# Patient Record
Sex: MALE | Race: ASIAN | NOT HISPANIC OR LATINO | URBAN - METROPOLITAN AREA
[De-identification: names, ages, dates, MRNs, and addresses within clinical notes are randomized per-mention and may not be internally consistent; named-entity substitution may affect disease eponyms.]

---

## 2024-11-11 RX ORDER — CILOSTAZOL 100 MG/1
1 TABLET ORAL
Refills: 0 | DISCHARGE

## 2024-11-11 RX ORDER — ASPIRIN/MAG CARB/ALUMINUM AMIN 325 MG
1 TABLET ORAL
Refills: 0 | DISCHARGE

## 2024-11-11 RX ORDER — SODIUM CHLORIDE 9 MG/ML
1000 INJECTION, SOLUTION INTRAMUSCULAR; INTRAVENOUS; SUBCUTANEOUS
Refills: 0 | Status: DISCONTINUED | OUTPATIENT
Start: 2024-11-13 | End: 2024-11-27

## 2024-11-11 RX ORDER — FENOFIBRATE 145 MG/1
1 TABLET, FILM COATED ORAL
Refills: 0 | DISCHARGE

## 2024-11-11 RX ORDER — LOSARTAN POTASSIUM 25 MG/1
1 TABLET ORAL
Refills: 0 | DISCHARGE

## 2024-11-11 RX ORDER — ICOSAPENT ETHYL 1 G/1
2 CAPSULE, LIQUID FILLED ORAL
Refills: 0 | DISCHARGE

## 2024-11-11 RX ORDER — CHLORHEXIDINE GLUCONATE 40 MG/ML
1 SOLUTION TOPICAL ONCE
Refills: 0 | Status: DISCONTINUED | OUTPATIENT
Start: 2024-11-13 | End: 2024-11-27

## 2024-11-11 RX ORDER — AMLODIPINE BESYLATE 10 MG
1 TABLET ORAL
Refills: 0 | DISCHARGE

## 2024-11-11 RX ORDER — SIMVASTATIN 80 MG/1
1 TABLET, FILM COATED ORAL
Refills: 0 | DISCHARGE

## 2024-11-11 NOTE — H&P ADULT - NSICDXPASTMEDICALHX_GEN_ALL_CORE_FT
PAST MEDICAL HISTORY:  Arthritis     HLD (hyperlipidemia)     HTN (hypertension)     PVD (peripheral vascular disease)

## 2024-11-11 NOTE — H&P ADULT - ASSESSMENT
Pt is a 68 YOM current smoker with a PMHx of PVD, arthritis, HTN, and HLD. He was seen on (9/27/24) by  c/o developing severe bilateral thigh, leg, and calf pain with exertion. He mentioned that the pain was relieved with rest. He is only able to walk less than one block and climb less than one flight of stairs before developing claudication.     Echo (8/9/24): LVEF of 70%, Normal wall motion, Trace AI, mild MR, trace to mild TR  SERENA (8/19/24): Right sided findings: resting SERENA and waveforms demonstrate mild (0.70-0.89) popliteal-tibial disease at rest; TBI and waveforms demonstrate mild/mod (0.41-0.70) disease.   Left sided findings: Resting SERENA and waveforms demonstrate mild (0.70-0.89) popliteal-tibial disease at rest; TBI and waveforms demonstrate mild/moderate (0.41-0.70) disease.   LE Arterial Duplex: (9/27/24): 1. There is evidence of occluded stenosis in the right mid superficial and distal SFA. There is evidence of severe (75-99%) stenosis in the left deep femoral artery and left SFA. There is evidence of mild (1-49%) stenosis in the left posterior tibial artery and moderate (50-75) stenosis in the left anterior tibial artery.   2. All other major arteries of the bilateral LE's have no hemodynamically significant stenosis visualized.   Aorta-Illiac Duplex: (9/27/24): The abdominal aorta was visualized from the subxiphoid window to the bilateral common and external iliac arteries. 2. There is no evidence of aneurysm seen in the abdominal aorta. 3. There is no stenosis seen in the proximal, mid, and distal abdominal aorta. 4. There is no evidence of dissection/thrombus present. 5. There is mod stenosis in the bilateral common iliac artery. There is severe stenosis in the left external iliac artery.     In light of Pt's symptoms, risk factors, and abnormal US Doppler LE arterial. Pt now presents for a Iliac intervention.       Mandarin ID used   Malampatti 2   ASA 2  Pt H+H and platelets WNL, Pt without any reports of BRBPR, hematuria, prior ICH, melena and no bleeding risks.   Patient loaded with Aspirin 81, Plavix 600   Pt Cr. and pre cath fluids ordered per protocol 250ml IV bolus over 30min, and maintenance 75 ml for 2 hours  Pt is a Candidate for Moderate Sedation

## 2024-11-11 NOTE — H&P ADULT - HISTORY OF PRESENT ILLNESS
Cardiology: Dr.David GARO Barfield  Pharmacy:  Escort:      Pt is a 68 YOM current smoker with a PMHx of PVD, arthritis, HTN, and HLD. He was seen on (9/27/24) by  c/o developing severe bilateral thigh, leg, and calf pain with exertion. He mentioned that the pain was relieved with rest. He is only able to walk less than one block and climb less than one flight of stairs before developing claudication.     Nuclear Stress Test (08/19/24):   Impressions: 1. Negative stress ECG for ischemia after pharmacological stress, abnormal myocardial perfusion imaging, showing a small amount of ischemia in the inferior location. overall LV systolic function was normal without RWMA's.     Echo (8/9/24): LVEF of 70%, Normal wall motion, Trace AI, mild MR, trace to mild TR    SERENA (8/19/24): Right sided findings: resting SERENA and waveforms demonstrate mild (0.70-0.89) popliteal-tibial disease at rest; TBI and waveforms demonstrate mild/mod (0.41-0.70) disease.   Left sided findings: Resting SERENA and waveforms demonstrate mild (0.70-0.89) popliteal-tibial disease at rest; TBI and waveforms demonstrate mild/moderate (0.41-0.70) disease.     LE Arterial Duplex: (9/27/24): 1. There is evidence of occluded stenosis in the right mid superficial and distal SFA. There is evidence of severe (75-99%) stenosis in the left deep femoral artery and left SFA. There is evidence of mild (1-49%) stenosis in the left posterior tibial artery and moderate (50-75) stenosis in the left anterior tibial artery.   2. All other major arteries of the bilateral LE's have no hemodynamically significant stenosis visualized.     Aorta-Illiac Duplex: (9/27/24): The abdominal aorta was visualized from the subxiphoid window to the bilateral common and external iliac arteries. 2. There is no evidence of aneurysm seen in the abdominal aorta. 3. There is no stenosis seen in the proximal, mid, and distal abdominal aorta. 4. There is no evidence of dissection/thrombus present. 5. There is mod stenosis in the bilateral common iliac artery. There is severe stenosis in the left external iliac artery.       In light of Pt's symptoms, risk factors, and abnormal US Doppler LE arterial. Pt now presents for a Iliac intervention.  Cardiology: Dr.David GARO Barfield  Pharmacy:   Escort: Daughter       Pt is a 68 YOM current smoker with a PMHx of PVD, arthritis, HTN, and HLD. He was seen on (9/27/24) by  c/o developing severe bilateral thigh, leg, and calf pain with exertion. He mentioned that the pain was relieved with rest. He is only able to walk less than one block and climb less than one flight of stairs before developing claudication.     Echo (8/9/24): LVEF of 70%, Normal wall motion, Trace AI, mild MR, trace to mild TR  SERENA (8/19/24): Right sided findings: resting SERENA and waveforms demonstrate mild (0.70-0.89) popliteal-tibial disease at rest; TBI and waveforms demonstrate mild/mod (0.41-0.70) disease.   Left sided findings: Resting SERENA and waveforms demonstrate mild (0.70-0.89) popliteal-tibial disease at rest; TBI and waveforms demonstrate mild/moderate (0.41-0.70) disease.   LE Arterial Duplex: (9/27/24): 1. There is evidence of occluded stenosis in the right mid superficial and distal SFA. There is evidence of severe (75-99%) stenosis in the left deep femoral artery and left SFA. There is evidence of mild (1-49%) stenosis in the left posterior tibial artery and moderate (50-75) stenosis in the left anterior tibial artery.   2. All other major arteries of the bilateral LE's have no hemodynamically significant stenosis visualized.   Aorta-Illiac Duplex: (9/27/24): The abdominal aorta was visualized from the subxiphoid window to the bilateral common and external iliac arteries. 2. There is no evidence of aneurysm seen in the abdominal aorta. 3. There is no stenosis seen in the proximal, mid, and distal abdominal aorta. 4. There is no evidence of dissection/thrombus present. 5. There is mod stenosis in the bilateral common iliac artery. There is severe stenosis in the left external iliac artery.       In light of Pt's symptoms, risk factors, and abnormal US Doppler LE arterial. Pt now presents for a Iliac intervention.

## 2024-11-13 ENCOUNTER — OUTPATIENT (OUTPATIENT)
Dept: OUTPATIENT SERVICES | Facility: HOSPITAL | Age: 69
LOS: 1 days | Discharge: HOME | End: 2024-11-13
Payer: MEDICARE

## 2024-11-13 DIAGNOSIS — I73.9 PERIPHERAL VASCULAR DISEASE, UNSPECIFIED: ICD-10-CM

## 2024-11-13 DIAGNOSIS — Z79.82 LONG TERM (CURRENT) USE OF ASPIRIN: ICD-10-CM

## 2024-11-13 DIAGNOSIS — E78.5 HYPERLIPIDEMIA, UNSPECIFIED: ICD-10-CM

## 2024-11-13 DIAGNOSIS — I10 ESSENTIAL (PRIMARY) HYPERTENSION: ICD-10-CM

## 2024-11-13 DIAGNOSIS — M06.9 RHEUMATOID ARTHRITIS, UNSPECIFIED: ICD-10-CM

## 2024-11-13 DIAGNOSIS — F17.200 NICOTINE DEPENDENCE, UNSPECIFIED, UNCOMPLICATED: ICD-10-CM

## 2024-11-13 LAB
A1C WITH ESTIMATED AVERAGE GLUCOSE RESULT: 5.7 % — HIGH (ref 4–5.6)
ALBUMIN SERPL ELPH-MCNC: 4.7 G/DL — SIGNIFICANT CHANGE UP (ref 3.3–5)
ALP SERPL-CCNC: 40 U/L — SIGNIFICANT CHANGE UP (ref 40–120)
ALT FLD-CCNC: 23 U/L — SIGNIFICANT CHANGE UP (ref 10–45)
ANION GAP SERPL CALC-SCNC: 12 MMOL/L — SIGNIFICANT CHANGE UP (ref 5–17)
ANION GAP SERPL CALC-SCNC: 9 MMOL/L — SIGNIFICANT CHANGE UP (ref 5–17)
APTT BLD: 33.2 SEC — SIGNIFICANT CHANGE UP (ref 24.5–35.6)
AST SERPL-CCNC: 27 U/L — SIGNIFICANT CHANGE UP (ref 10–40)
BASOPHILS # BLD AUTO: 0.03 K/UL — SIGNIFICANT CHANGE UP (ref 0–0.2)
BASOPHILS NFR BLD AUTO: 0.6 % — SIGNIFICANT CHANGE UP (ref 0–2)
BILIRUB SERPL-MCNC: 0.4 MG/DL — SIGNIFICANT CHANGE UP (ref 0.2–1.2)
BUN SERPL-MCNC: 30 MG/DL — HIGH (ref 7–23)
BUN SERPL-MCNC: 35 MG/DL — HIGH (ref 7–23)
CALCIUM SERPL-MCNC: 10.1 MG/DL — SIGNIFICANT CHANGE UP (ref 8.4–10.5)
CALCIUM SERPL-MCNC: 9.2 MG/DL — SIGNIFICANT CHANGE UP (ref 8.4–10.5)
CHLORIDE SERPL-SCNC: 100 MMOL/L — SIGNIFICANT CHANGE UP (ref 96–108)
CHLORIDE SERPL-SCNC: 102 MMOL/L — SIGNIFICANT CHANGE UP (ref 96–108)
CHOLEST SERPL-MCNC: 241 MG/DL — HIGH
CK MB CFR SERPL CALC: 2.1 NG/ML — SIGNIFICANT CHANGE UP (ref 0–6.7)
CK SERPL-CCNC: 93 U/L — SIGNIFICANT CHANGE UP (ref 30–200)
CO2 SERPL-SCNC: 23 MMOL/L — SIGNIFICANT CHANGE UP (ref 22–31)
CO2 SERPL-SCNC: 24 MMOL/L — SIGNIFICANT CHANGE UP (ref 22–31)
CREAT SERPL-MCNC: 1.48 MG/DL — HIGH (ref 0.5–1.3)
CREAT SERPL-MCNC: 1.59 MG/DL — HIGH (ref 0.5–1.3)
EGFR: 46 ML/MIN/1.73M2 — LOW
EGFR: 50 ML/MIN/1.73M2 — LOW
EOSINOPHIL # BLD AUTO: 0.19 K/UL — SIGNIFICANT CHANGE UP (ref 0–0.5)
EOSINOPHIL NFR BLD AUTO: 3.7 % — SIGNIFICANT CHANGE UP (ref 0–6)
ESTIMATED AVERAGE GLUCOSE: 117 MG/DL — HIGH (ref 68–114)
GLUCOSE SERPL-MCNC: 106 MG/DL — HIGH (ref 70–99)
GLUCOSE SERPL-MCNC: 138 MG/DL — HIGH (ref 70–99)
HCT VFR BLD CALC: 31.2 % — LOW (ref 39–50)
HCT VFR BLD CALC: 36.1 % — LOW (ref 39–50)
HDLC SERPL-MCNC: 129 MG/DL — SIGNIFICANT CHANGE UP
HGB BLD-MCNC: 10.2 G/DL — LOW (ref 13–17)
HGB BLD-MCNC: 11.2 G/DL — LOW (ref 13–17)
IMM GRANULOCYTES NFR BLD AUTO: 0.2 % — SIGNIFICANT CHANGE UP (ref 0–0.9)
INR BLD: 0.91 — SIGNIFICANT CHANGE UP (ref 0.85–1.16)
LIPID PNL WITH DIRECT LDL SERPL: 94 MG/DL — SIGNIFICANT CHANGE UP
LYMPHOCYTES # BLD AUTO: 1.23 K/UL — SIGNIFICANT CHANGE UP (ref 1–3.3)
LYMPHOCYTES # BLD AUTO: 23.8 % — SIGNIFICANT CHANGE UP (ref 13–44)
MAGNESIUM SERPL-MCNC: 1.8 MG/DL — SIGNIFICANT CHANGE UP (ref 1.6–2.6)
MAGNESIUM SERPL-MCNC: 1.9 MG/DL — SIGNIFICANT CHANGE UP (ref 1.6–2.6)
MCHC RBC-ENTMCNC: 29.9 PG — SIGNIFICANT CHANGE UP (ref 27–34)
MCHC RBC-ENTMCNC: 31 G/DL — LOW (ref 32–36)
MCHC RBC-ENTMCNC: 31.5 PG — SIGNIFICANT CHANGE UP (ref 27–34)
MCHC RBC-ENTMCNC: 32.7 G/DL — SIGNIFICANT CHANGE UP (ref 32–36)
MCV RBC AUTO: 96.3 FL — SIGNIFICANT CHANGE UP (ref 80–100)
MCV RBC AUTO: 96.3 FL — SIGNIFICANT CHANGE UP (ref 80–100)
MONOCYTES # BLD AUTO: 0.66 K/UL — SIGNIFICANT CHANGE UP (ref 0–0.9)
MONOCYTES NFR BLD AUTO: 12.8 % — SIGNIFICANT CHANGE UP (ref 2–14)
NEUTROPHILS # BLD AUTO: 3.05 K/UL — SIGNIFICANT CHANGE UP (ref 1.8–7.4)
NEUTROPHILS NFR BLD AUTO: 58.9 % — SIGNIFICANT CHANGE UP (ref 43–77)
NON HDL CHOLESTEROL: 112 MG/DL — SIGNIFICANT CHANGE UP
NRBC # BLD: 0 /100 WBCS — SIGNIFICANT CHANGE UP (ref 0–0)
NRBC # BLD: 0 /100 WBCS — SIGNIFICANT CHANGE UP (ref 0–0)
PLATELET # BLD AUTO: 253 K/UL — SIGNIFICANT CHANGE UP (ref 150–400)
PLATELET # BLD AUTO: 299 K/UL — SIGNIFICANT CHANGE UP (ref 150–400)
POTASSIUM SERPL-MCNC: 4.5 MMOL/L — SIGNIFICANT CHANGE UP (ref 3.5–5.3)
POTASSIUM SERPL-MCNC: 4.7 MMOL/L — SIGNIFICANT CHANGE UP (ref 3.5–5.3)
POTASSIUM SERPL-SCNC: 4.5 MMOL/L — SIGNIFICANT CHANGE UP (ref 3.5–5.3)
POTASSIUM SERPL-SCNC: 4.7 MMOL/L — SIGNIFICANT CHANGE UP (ref 3.5–5.3)
PROT SERPL-MCNC: 7.7 G/DL — SIGNIFICANT CHANGE UP (ref 6–8.3)
PROTHROM AB SERPL-ACNC: 10.5 SEC — SIGNIFICANT CHANGE UP (ref 9.9–13.4)
RBC # BLD: 3.24 M/UL — LOW (ref 4.2–5.8)
RBC # BLD: 3.75 M/UL — LOW (ref 4.2–5.8)
RBC # FLD: 13.2 % — SIGNIFICANT CHANGE UP (ref 10.3–14.5)
RBC # FLD: 13.2 % — SIGNIFICANT CHANGE UP (ref 10.3–14.5)
SODIUM SERPL-SCNC: 134 MMOL/L — LOW (ref 135–145)
SODIUM SERPL-SCNC: 136 MMOL/L — SIGNIFICANT CHANGE UP (ref 135–145)
TRIGL SERPL-MCNC: 112 MG/DL — SIGNIFICANT CHANGE UP
WBC # BLD: 5.17 K/UL — SIGNIFICANT CHANGE UP (ref 3.8–10.5)
WBC # BLD: 6.46 K/UL — SIGNIFICANT CHANGE UP (ref 3.8–10.5)
WBC # FLD AUTO: 5.17 K/UL — SIGNIFICANT CHANGE UP (ref 3.8–10.5)
WBC # FLD AUTO: 6.46 K/UL — SIGNIFICANT CHANGE UP (ref 3.8–10.5)

## 2024-11-13 PROCEDURE — 80061 LIPID PANEL: CPT

## 2024-11-13 PROCEDURE — 75716 ARTERY X-RAYS ARMS/LEGS: CPT | Mod: 26,XU

## 2024-11-13 PROCEDURE — C9765: CPT

## 2024-11-13 PROCEDURE — 85730 THROMBOPLASTIN TIME PARTIAL: CPT

## 2024-11-13 PROCEDURE — 83735 ASSAY OF MAGNESIUM: CPT

## 2024-11-13 PROCEDURE — C1760: CPT

## 2024-11-13 PROCEDURE — 80053 COMPREHEN METABOLIC PANEL: CPT

## 2024-11-13 PROCEDURE — 82553 CREATINE MB FRACTION: CPT

## 2024-11-13 PROCEDURE — 83036 HEMOGLOBIN GLYCOSYLATED A1C: CPT

## 2024-11-13 PROCEDURE — 80048 BASIC METABOLIC PNL TOTAL CA: CPT

## 2024-11-13 PROCEDURE — C1894: CPT

## 2024-11-13 PROCEDURE — 85025 COMPLETE CBC W/AUTO DIFF WBC: CPT

## 2024-11-13 PROCEDURE — 85610 PROTHROMBIN TIME: CPT

## 2024-11-13 PROCEDURE — 80076 HEPATIC FUNCTION PANEL: CPT

## 2024-11-13 PROCEDURE — C1887: CPT

## 2024-11-13 PROCEDURE — 82550 ASSAY OF CK (CPK): CPT

## 2024-11-13 PROCEDURE — C1876: CPT

## 2024-11-13 PROCEDURE — 85027 COMPLETE CBC AUTOMATED: CPT

## 2024-11-13 PROCEDURE — 85347 COAGULATION TIME ACTIVATED: CPT

## 2024-11-13 PROCEDURE — 37221: CPT | Mod: 50

## 2024-11-13 PROCEDURE — C1725: CPT

## 2024-11-13 PROCEDURE — C1769: CPT

## 2024-11-13 RX ORDER — SODIUM CHLORIDE 9 MG/ML
250 INJECTION, SOLUTION INTRAMUSCULAR; INTRAVENOUS; SUBCUTANEOUS ONCE
Refills: 0 | Status: COMPLETED | OUTPATIENT
Start: 2024-11-13 | End: 2024-11-13

## 2024-11-13 RX ORDER — ASPIRIN/MAG CARB/ALUMINUM AMIN 325 MG
1 TABLET ORAL
Qty: 30 | Refills: 11
Start: 2024-11-13 | End: 2025-11-07

## 2024-11-13 RX ORDER — SODIUM CHLORIDE 9 MG/ML
1000 INJECTION, SOLUTION INTRAMUSCULAR; INTRAVENOUS; SUBCUTANEOUS
Refills: 0 | Status: DISCONTINUED | OUTPATIENT
Start: 2024-11-13 | End: 2024-11-27

## 2024-11-13 RX ORDER — CLOPIDOGREL 75 MG/1
600 TABLET ORAL ONCE
Refills: 0 | Status: COMPLETED | OUTPATIENT
Start: 2024-11-13 | End: 2024-11-13

## 2024-11-13 RX ORDER — CLOPIDOGREL 75 MG/1
1 TABLET ORAL
Qty: 30 | Refills: 11
Start: 2024-11-13 | End: 2025-11-07

## 2024-11-13 RX ORDER — ASPIRIN/MAG CARB/ALUMINUM AMIN 325 MG
81 TABLET ORAL ONCE
Refills: 0 | Status: COMPLETED | OUTPATIENT
Start: 2024-11-13 | End: 2024-11-13

## 2024-11-13 RX ORDER — SIMVASTATIN 80 MG/1
1 TABLET, FILM COATED ORAL
Qty: 30 | Refills: 3
Start: 2024-11-13 | End: 2025-03-12

## 2024-11-13 RX ORDER — MAGNESIUM OXIDE 400 MG/1
800 TABLET ORAL ONCE
Refills: 0 | Status: COMPLETED | OUTPATIENT
Start: 2024-11-13 | End: 2024-11-13

## 2024-11-13 RX ORDER — MORPHINE SULFATE 30 MG/1
2 TABLET, EXTENDED RELEASE ORAL ONCE
Refills: 0 | Status: DISCONTINUED | OUTPATIENT
Start: 2024-11-13 | End: 2024-11-13

## 2024-11-13 RX ADMIN — MORPHINE SULFATE 2 MILLIGRAM(S): 30 TABLET, EXTENDED RELEASE ORAL at 16:02

## 2024-11-13 RX ADMIN — SODIUM CHLORIDE 500 MILLILITER(S): 9 INJECTION, SOLUTION INTRAMUSCULAR; INTRAVENOUS; SUBCUTANEOUS at 11:48

## 2024-11-13 RX ADMIN — MAGNESIUM OXIDE 800 MILLIGRAM(S): 400 TABLET ORAL at 19:27

## 2024-11-13 RX ADMIN — SODIUM CHLORIDE 250 MILLILITER(S): 9 INJECTION, SOLUTION INTRAMUSCULAR; INTRAVENOUS; SUBCUTANEOUS at 16:02

## 2024-11-13 RX ADMIN — Medication 81 MILLIGRAM(S): at 11:55

## 2024-11-13 RX ADMIN — CLOPIDOGREL 600 MILLIGRAM(S): 75 TABLET ORAL at 11:55

## 2024-11-13 RX ADMIN — MORPHINE SULFATE 2 MILLIGRAM(S): 30 TABLET, EXTENDED RELEASE ORAL at 16:54

## 2024-11-13 RX ADMIN — SODIUM CHLORIDE 75 MILLILITER(S): 9 INJECTION, SOLUTION INTRAMUSCULAR; INTRAVENOUS; SUBCUTANEOUS at 11:48

## 2024-11-13 NOTE — PROGRESS NOTE ADULT - SUBJECTIVE AND OBJECTIVE BOX
Interventional Cardiology PA Post PCI SDA Discharge Note      Patient without complaints. Ambulated and voided without difficulties    Afebrile, VSS    Ext: Left Groin: No hematoma, No bruit, dressing; C/D/I  Left Radial : No hematoma, No bleeding, dressing; C/D/I      Pulses:    intact RAD/DP/PT to baseline     A/P:  Pt is a 68 YOM current smoker with a PMHx of PVD, arthritis, HTN, and HLD. He was seen on (9/27/24) by  c/o developing severe bilateral thigh, leg, and calf pain with exertion. He mentioned that the pain was relieved with rest. He is only able to walk less than one block and climb less than one flight of stairs before developing claudication.     Echo (8/9/24): LVEF of 70%, Normal wall motion, Trace AI, mild MR, trace to mild TR  SERENA (8/19/24): Right sided findings: resting SERENA and waveforms demonstrate mild (0.70-0.89) popliteal-tibial disease at rest; TBI and waveforms demonstrate mild/mod (0.41-0.70) disease.   Left sided findings: Resting SERENA and waveforms demonstrate mild (0.70-0.89) popliteal-tibial disease at rest; TBI and waveforms demonstrate mild/moderate (0.41-0.70) disease.   LE Arterial Duplex: (9/27/24): 1. There is evidence of occluded stenosis in the right mid superficial and distal SFA. There is evidence of severe (75-99%) stenosis in the left deep femoral artery and left SFA. There is evidence of mild (1-49%) stenosis in the left posterior tibial artery and moderate (50-75) stenosis in the left anterior tibial artery.   2. All other major arteries of the bilateral LE's have no hemodynamically significant stenosis visualized.   Aorta-Illiac Duplex: (9/27/24): The abdominal aorta was visualized from the subxiphoid window to the bilateral common and external iliac arteries. 2. There is no evidence of aneurysm seen in the abdominal aorta. 3. There is no stenosis seen in the proximal, mid, and distal abdominal aorta. 4. There is no evidence of dissection/thrombus present. 5. There is mod stenosis in the bilateral common iliac artery. There is severe stenosis in the left external iliac artery.       In light of Pt's symptoms, risk factors, and abnormal US Doppler LE arterial. Pt now presents for a Iliac intervention.       S/p Peripheral angiogram 11/13/24: Right distal common iliac artery 60-70%, YOSVANY x 1 Left external iliac artery 70-80%. Pt will return for staged PCI of dRCI in 5-6 weeks       ACCESS:  LRA (LTR), LFA (PC)  Post cath IVF: 250cc x 4 hours    INTERVENTIONALIST: Dr. Ortiz (F: Jesus Manuel)  F/U CARDS: Dr. Barfield  DAPT: ASA/Plavix  STATIN: simvastatin 20 mg     Post procedure pt had left arm hematoma extending from the wrist to the elbow. It was compressed out.          1.	       Follow-up with PMD/Cardiologist Barfield in 72 hours.  2.            Post procedure labs/EKG reviewed and stable.    3.            Pt given instructions on importance of taking antiplatelet medication.    4. 	        Stable for discharge as per attending Dr. Ortiz after bed rest, pt voids, groin/wrist stable and 30 minutes of ambulation.  5.             Prescriptions for Aspirin and Plavix e-prescribed and submitted to patient's pharmacy.    6.             Patient will continue statin simvastatin 20 mg.   7.	        Discharge forms signed and copies in chart

## 2024-12-31 PROBLEM — I10 ESSENTIAL (PRIMARY) HYPERTENSION: Chronic | Status: ACTIVE | Noted: 2024-11-11

## 2024-12-31 PROBLEM — E78.5 HYPERLIPIDEMIA, UNSPECIFIED: Chronic | Status: ACTIVE | Noted: 2024-11-11

## 2024-12-31 PROBLEM — I73.9 PERIPHERAL VASCULAR DISEASE, UNSPECIFIED: Chronic | Status: ACTIVE | Noted: 2024-11-11

## 2024-12-31 PROBLEM — M19.90 UNSPECIFIED OSTEOARTHRITIS, UNSPECIFIED SITE: Chronic | Status: ACTIVE | Noted: 2024-11-11

## 2025-01-09 VITALS
SYSTOLIC BLOOD PRESSURE: 144 MMHG | RESPIRATION RATE: 16 BRPM | HEIGHT: 64 IN | WEIGHT: 160.06 LBS | OXYGEN SATURATION: 96 % | DIASTOLIC BLOOD PRESSURE: 63 MMHG | HEART RATE: 68 BPM | TEMPERATURE: 98 F

## 2025-01-09 RX ORDER — CHLORHEXIDINE GLUCONATE 1.2 MG/ML
1 RINSE ORAL ONCE
Refills: 0 | Status: DISCONTINUED | OUTPATIENT
Start: 2025-01-15 | End: 2025-01-29

## 2025-01-09 RX ORDER — SODIUM CHLORIDE 9 MG/ML
1000 INJECTION, SOLUTION INTRAMUSCULAR; INTRAVENOUS; SUBCUTANEOUS
Refills: 0 | Status: DISCONTINUED | OUTPATIENT
Start: 2025-01-15 | End: 2025-01-29

## 2025-01-09 NOTE — H&P ADULT - ASSESSMENT
69M, Taishanese-speaking, current smoker and daily ETOH (150mL), with PMHx of HTN, HLD, CKD (Cr 1.39-1.59 recent labs), PVD s/p recent peripheral angiogram @[facility] 24 with successful YOSVANY to left external iliac artery, also noted to have 60-70% distal right common iliac artery lesion for which patient was recommended to return for staged intervention, with RLE claudication, who, in light of patient's risk factors, known residual disease, patient now presents for recommended intervention of right common iliac artery lesion.    EK24 OP EKG with NSR 75bpm, TW flat in AVL.		  ASA 	IV   Mallampati class: II  Shubham Class: II    -No Known Allergies    -H/H = 11.9/36.0  . Pt denies BRBPR, hematuria, hematochezia, melena. Pt endorses compliance w/ home DAPT Asa/Plavix, stating last dose was yesterday (25). Pt loaded w/ ASA 81mg x1 and Plavix 75mg x1  -BUN/Cr = 28/1.56  . EF not documented. Euvolemic on exam. IV NS @ 250cc bolus followed by 75cc/hr x 2hrs started pre procedure    Sedation Plan:   Moderate  Patient Is Suitable Candidate For Sedation?     Yes    Risks & benefits of procedure and alternative therapy have been explained to the patient utilizing Taishanese Chinese dialect Language Line # [id number], including but not limited to: allergic reaction, bleeding w/possible need for blood transfusion, infection, renal and vascular compromise, limb damage, stroke, Myocardial infarction, vessel dissection/perforation, emergent Vascular Surgery. Informed consent obtained and in chart

## 2025-01-09 NOTE — H&P ADULT - HISTORY OF PRESENT ILLNESS
Cardiologist: Dr. Barfield  Pharmacy:  Escort:    69 yr old M current smoker with PMHx of HTN, hyperlipidemia, PVD s/p recent peripheral angiogram @[facility] 11/13/24 with successful YOSVANY to left external iliac artery, also noted to have 60-70% distal right common iliac artery lesion for which patient was recommended to return for staged intervention. Patient reports since his prior procedure he has noticed an improvement in his LLE claudication. However, he continues to experience RLE calf/foot cramping upon walking 1/2 block. Patient denies any numbness, tingling, loss of sensation, nonhealing ulcers or wounds.     In light of patient's risk factors, know residual disease, patient now presents for recommended PTA/stent of right common iliac artery lesion.          NONINVASIVE IMAGING:  SERENA (8/19/24): Right sided findings: resting SERENA and waveforms demonstrate mild (0.70-0.89) popliteal-tibial disease at rest; TBI and waveforms demonstrate mild/mod (0.41-0.70) disease. Left sided findings: Resting SERENA and waveforms demonstrate mild (0.70-0.89) popliteal-tibial disease at rest; TBI and waveforms demonstrate mild/moderate (0.41-0.70) disease.     LE Arterial Duplex: (9/27/24): occluded stenosis in the right mid superficial and distal SFA. There is evidence of severe (75-99%) stenosis in the left deep femoral artery and left SFA. There is evidence of mild (1-49%) stenosis in the left posterior tibial artery and moderate (50-75) stenosis in the left anterior tibial artery.     Aorta-Iliac Duplex: (9/27/24): mod stenosis in the bilateral common iliac artery. There is severe stenosis in the left external iliac artery.      Cardiologist: Dr. Barfield  Pharmacy:  Escort:    69M, ___ speaking, current smoker with PMHx of HTN, hyperlipidemia, PVD s/p recent peripheral angiogram @[facility] 11/13/24 with successful YOSVANY to left external iliac artery, also noted to have 60-70% distal right common iliac artery lesion for which patient was recommended to return for staged intervention. Patient reports since his prior procedure he has noticed an improvement in his LLE claudication. However, he continues to experience RLE calf/foot cramping upon walking 1/2 block. Patient denies any numbness, tingling, loss of sensation, nonhealing ulcers or wounds.     Pt _______ chest pain, SOB, FALL,  palpitations, diaphoresis, orthopnea/PND, cough, leg swelling, dizziness, LOC, fall, syncope, N/V/D, fever/chills, hematuria, BRBPR, melena/hematochezia.    In light of patient's risk factors, know residual disease, patient now presents for recommended PTA/stent of right common iliac artery lesion.      NONINVASIVE IMAGING:  --SERENA (8/19/24): Right sided findings: resting SERENA and waveforms demonstrate mild (0.70-0.89) popliteal-tibial disease at rest; TBI and waveforms demonstrate mild/mod (0.41-0.70) disease. Left sided findings: Resting SERENA and waveforms demonstrate mild (0.70-0.89) popliteal-tibial disease at rest; TBI and waveforms demonstrate mild/moderate (0.41-0.70) disease.     --LE Arterial Duplex: (9/27/24): occluded stenosis in the right mid superficial and distal SFA. There is evidence of severe (75-99%) stenosis in the left deep femoral artery and left SFA. There is evidence of mild (1-49%) stenosis in the left posterior tibial artery and moderate (50-75) stenosis in the left anterior tibial artery.     --Aorta-Iliac Duplex: (9/27/24): mod stenosis in the bilateral common iliac artery. There is severe stenosis in the left external iliac artery.      Cardiologist: Dr. Barfield  Pharmacy: [facility] ([phone]; [address] [facility] [id number])  Escort: wife, dtr at bedside    69M, Taishanese-speaking, current smoker and daily ETOH (150mL), with PMHx of HTN, HLD, CKD (Cr 1.39-1.59 recent labs), PVD s/p recent peripheral angiogram @[facility] 11/13/24 with successful YOSVANY to left external iliac artery, also noted to have 60-70% distal right common iliac artery lesion for which patient was recommended to return for staged intervention. Patient reports since his prior procedure he has noticed an improvement in his LLE claudication. However, he continues to experience RLE calf/foot cramping upon walking 1/2 block. Patient denies any numbness, tingling, loss of sensation, nonhealing ulcers or wounds. On ROS, pt endorses dry cough x2-3 weeks, no sputum, denies fever/chills. Pt denies chest pain, SOB, FALL,  orthopnea/PND, leg swelling, dizziness, LOC, fall, syncope, N/V/D, hematuria, BRBPR. In light of patient's risk factors, known residual disease, patient now presents for recommended intervention of right common iliac artery lesion.      NONINVASIVE IMAGING:  --SERENA (8/19/24): Right sided findings: resting SERENA and waveforms demonstrate mild (0.70-0.89) popliteal-tibial disease at rest; TBI and waveforms demonstrate mild/mod (0.41-0.70) disease. Left sided findings: Resting SERENA and waveforms demonstrate mild (0.70-0.89) popliteal-tibial disease at rest; TBI and waveforms demonstrate mild/moderate (0.41-0.70) disease.     --LE Arterial Duplex: (9/27/24): occluded stenosis in the right mid superficial and distal SFA. There is evidence of severe (75-99%) stenosis in the left deep femoral artery and left SFA. There is evidence of mild (1-49%) stenosis in the left posterior tibial artery and moderate (50-75) stenosis in the left anterior tibial artery.     --Aorta-Iliac Duplex: (9/27/24): mod stenosis in the bilateral common iliac artery. There is severe stenosis in the left external iliac artery.

## 2025-01-09 NOTE — H&P ADULT - SOCIAL HISTORY: ALCOHOL USE
daily- reports 150mL of liquor nightly; denies morning or daytime drinking; last drink 1/14/25 dinner

## 2025-01-09 NOTE — H&P ADULT - NSHPLABSRESULTS_GEN_ALL_CORE
11.9   5.82  )-----------( 249      ( 15 Parag 2025 09:46 )             36.0       01-15    138  |  101  |  28[H]  ----------------------------<  96  4.8   |  23  |  1.56[H]    Ca    9.8      15 Parag 2025 09:46  Mg     2.0     01-15        PT/INR - ( 15 Parag 2025 09:46 )   PT: 10.1 sec;   INR: 0.86          PTT - ( 15 Parag 2025 09:46 )  PTT:31.8 sec          Urinalysis Basic - ( 15 Parag 2025 09:46 )    Color: x / Appearance: x / SG: x / pH: x  Gluc: 96 mg/dL / Ketone: x  / Bili: x / Urobili: x   Blood: x / Protein: x / Nitrite: x   Leuk Esterase: x / RBC: x / WBC x   Sq Epi: x / Non Sq Epi: x / Bacteria: x

## 2025-01-15 ENCOUNTER — OUTPATIENT (OUTPATIENT)
Dept: OUTPATIENT SERVICES | Facility: HOSPITAL | Age: 70
LOS: 1 days | Discharge: HOME | End: 2025-01-15
Payer: MEDICARE

## 2025-01-15 DIAGNOSIS — Z95.828 PRESENCE OF OTHER VASCULAR IMPLANTS AND GRAFTS: Chronic | ICD-10-CM

## 2025-01-15 LAB
A1C WITH ESTIMATED AVERAGE GLUCOSE RESULT: 5.4 % — SIGNIFICANT CHANGE UP (ref 4–5.6)
ANION GAP SERPL CALC-SCNC: 14 MMOL/L — SIGNIFICANT CHANGE UP (ref 5–17)
ANION GAP SERPL CALC-SCNC: 9 MMOL/L — SIGNIFICANT CHANGE UP (ref 5–17)
APTT BLD: 31.8 SEC — SIGNIFICANT CHANGE UP (ref 24.5–35.6)
BASOPHILS # BLD AUTO: 0.04 K/UL — SIGNIFICANT CHANGE UP (ref 0–0.2)
BASOPHILS NFR BLD AUTO: 0.7 % — SIGNIFICANT CHANGE UP (ref 0–2)
BUN SERPL-MCNC: 26 MG/DL — HIGH (ref 7–23)
BUN SERPL-MCNC: 28 MG/DL — HIGH (ref 7–23)
CALCIUM SERPL-MCNC: 9.1 MG/DL — SIGNIFICANT CHANGE UP (ref 8.4–10.5)
CALCIUM SERPL-MCNC: 9.8 MG/DL — SIGNIFICANT CHANGE UP (ref 8.4–10.5)
CHLORIDE SERPL-SCNC: 101 MMOL/L — SIGNIFICANT CHANGE UP (ref 96–108)
CHLORIDE SERPL-SCNC: 105 MMOL/L — SIGNIFICANT CHANGE UP (ref 96–108)
CHOLEST SERPL-MCNC: 226 MG/DL — HIGH
CO2 SERPL-SCNC: 23 MMOL/L — SIGNIFICANT CHANGE UP (ref 22–31)
CO2 SERPL-SCNC: 24 MMOL/L — SIGNIFICANT CHANGE UP (ref 22–31)
CREAT SERPL-MCNC: 1.44 MG/DL — HIGH (ref 0.5–1.3)
CREAT SERPL-MCNC: 1.56 MG/DL — HIGH (ref 0.5–1.3)
EGFR: 47 ML/MIN/1.73M2 — LOW
EGFR: 52 ML/MIN/1.73M2 — LOW
EOSINOPHIL # BLD AUTO: 0.26 K/UL — SIGNIFICANT CHANGE UP (ref 0–0.5)
EOSINOPHIL NFR BLD AUTO: 4.5 % — SIGNIFICANT CHANGE UP (ref 0–6)
ESTIMATED AVERAGE GLUCOSE: 108 MG/DL — SIGNIFICANT CHANGE UP (ref 68–114)
GLUCOSE SERPL-MCNC: 127 MG/DL — HIGH (ref 70–99)
GLUCOSE SERPL-MCNC: 96 MG/DL — SIGNIFICANT CHANGE UP (ref 70–99)
HCT VFR BLD CALC: 32.8 % — LOW (ref 39–50)
HCT VFR BLD CALC: 36 % — LOW (ref 39–50)
HDLC SERPL-MCNC: 113 MG/DL — SIGNIFICANT CHANGE UP
HGB BLD-MCNC: 10.9 G/DL — LOW (ref 13–17)
HGB BLD-MCNC: 11.9 G/DL — LOW (ref 13–17)
IMM GRANULOCYTES NFR BLD AUTO: 0.3 % — SIGNIFICANT CHANGE UP (ref 0–0.9)
INR BLD: 0.86 — SIGNIFICANT CHANGE UP (ref 0.85–1.16)
LIPID PNL WITH DIRECT LDL SERPL: 97 MG/DL — SIGNIFICANT CHANGE UP
LYMPHOCYTES # BLD AUTO: 1.3 K/UL — SIGNIFICANT CHANGE UP (ref 1–3.3)
LYMPHOCYTES # BLD AUTO: 22.3 % — SIGNIFICANT CHANGE UP (ref 13–44)
MAGNESIUM SERPL-MCNC: 1.7 MG/DL — SIGNIFICANT CHANGE UP (ref 1.6–2.6)
MCHC RBC-ENTMCNC: 31.8 PG — SIGNIFICANT CHANGE UP (ref 27–34)
MCHC RBC-ENTMCNC: 31.9 PG — SIGNIFICANT CHANGE UP (ref 27–34)
MCHC RBC-ENTMCNC: 33.1 G/DL — SIGNIFICANT CHANGE UP (ref 32–36)
MCHC RBC-ENTMCNC: 33.2 G/DL — SIGNIFICANT CHANGE UP (ref 32–36)
MCV RBC AUTO: 95.6 FL — SIGNIFICANT CHANGE UP (ref 80–100)
MCV RBC AUTO: 96.5 FL — SIGNIFICANT CHANGE UP (ref 80–100)
MONOCYTES # BLD AUTO: 0.61 K/UL — SIGNIFICANT CHANGE UP (ref 0–0.9)
MONOCYTES NFR BLD AUTO: 10.5 % — SIGNIFICANT CHANGE UP (ref 2–14)
NEUTROPHILS # BLD AUTO: 3.59 K/UL — SIGNIFICANT CHANGE UP (ref 1.8–7.4)
NEUTROPHILS NFR BLD AUTO: 61.7 % — SIGNIFICANT CHANGE UP (ref 43–77)
NON HDL CHOLESTEROL: 113 MG/DL — SIGNIFICANT CHANGE UP
NRBC # BLD: 0 /100 WBCS — SIGNIFICANT CHANGE UP (ref 0–0)
NRBC # BLD: 0 /100 WBCS — SIGNIFICANT CHANGE UP (ref 0–0)
PLATELET # BLD AUTO: 229 K/UL — SIGNIFICANT CHANGE UP (ref 150–400)
PLATELET # BLD AUTO: 249 K/UL — SIGNIFICANT CHANGE UP (ref 150–400)
POTASSIUM SERPL-MCNC: 4.4 MMOL/L — SIGNIFICANT CHANGE UP (ref 3.5–5.3)
POTASSIUM SERPL-MCNC: 4.8 MMOL/L — SIGNIFICANT CHANGE UP (ref 3.5–5.3)
POTASSIUM SERPL-SCNC: 4.4 MMOL/L — SIGNIFICANT CHANGE UP (ref 3.5–5.3)
POTASSIUM SERPL-SCNC: 4.8 MMOL/L — SIGNIFICANT CHANGE UP (ref 3.5–5.3)
PROTHROM AB SERPL-ACNC: 10.1 SEC — SIGNIFICANT CHANGE UP (ref 9.9–13.4)
RBC # BLD: 3.43 M/UL — LOW (ref 4.2–5.8)
RBC # BLD: 3.73 M/UL — LOW (ref 4.2–5.8)
RBC # FLD: 13.1 % — SIGNIFICANT CHANGE UP (ref 10.3–14.5)
RBC # FLD: 13.1 % — SIGNIFICANT CHANGE UP (ref 10.3–14.5)
SODIUM SERPL-SCNC: 138 MMOL/L — SIGNIFICANT CHANGE UP (ref 135–145)
SODIUM SERPL-SCNC: 138 MMOL/L — SIGNIFICANT CHANGE UP (ref 135–145)
TRIGL SERPL-MCNC: 94 MG/DL — SIGNIFICANT CHANGE UP
WBC # BLD: 5.82 K/UL — SIGNIFICANT CHANGE UP (ref 3.8–10.5)
WBC # BLD: 6.01 K/UL — SIGNIFICANT CHANGE UP (ref 3.8–10.5)
WBC # FLD AUTO: 5.82 K/UL — SIGNIFICANT CHANGE UP (ref 3.8–10.5)
WBC # FLD AUTO: 6.01 K/UL — SIGNIFICANT CHANGE UP (ref 3.8–10.5)

## 2025-01-15 PROCEDURE — 36415 COLL VENOUS BLD VENIPUNCTURE: CPT

## 2025-01-15 PROCEDURE — C1760: CPT

## 2025-01-15 PROCEDURE — C1894: CPT

## 2025-01-15 PROCEDURE — 85730 THROMBOPLASTIN TIME PARTIAL: CPT

## 2025-01-15 PROCEDURE — 85347 COAGULATION TIME ACTIVATED: CPT

## 2025-01-15 PROCEDURE — 85027 COMPLETE CBC AUTOMATED: CPT

## 2025-01-15 PROCEDURE — C9765: CPT | Mod: RT

## 2025-01-15 PROCEDURE — 80048 BASIC METABOLIC PNL TOTAL CA: CPT

## 2025-01-15 PROCEDURE — C1769: CPT

## 2025-01-15 PROCEDURE — 37221: CPT | Mod: RT

## 2025-01-15 PROCEDURE — 93005 ELECTROCARDIOGRAM TRACING: CPT

## 2025-01-15 PROCEDURE — 80061 LIPID PANEL: CPT

## 2025-01-15 PROCEDURE — 83036 HEMOGLOBIN GLYCOSYLATED A1C: CPT

## 2025-01-15 PROCEDURE — 83735 ASSAY OF MAGNESIUM: CPT

## 2025-01-15 PROCEDURE — 85025 COMPLETE CBC W/AUTO DIFF WBC: CPT

## 2025-01-15 PROCEDURE — 93010 ELECTROCARDIOGRAM REPORT: CPT

## 2025-01-15 PROCEDURE — C1876: CPT

## 2025-01-15 PROCEDURE — C1725: CPT

## 2025-01-15 PROCEDURE — C1887: CPT

## 2025-01-15 PROCEDURE — 85610 PROTHROMBIN TIME: CPT

## 2025-01-15 RX ORDER — CLOPIDOGREL BISULFATE 75 MG/1
75 TABLET, FILM COATED ORAL ONCE
Refills: 0 | Status: COMPLETED | OUTPATIENT
Start: 2025-01-15 | End: 2025-01-15

## 2025-01-15 RX ORDER — ASPIRIN 81 MG
81 TABLET, DELAYED RELEASE (ENTERIC COATED) ORAL ONCE
Refills: 0 | Status: COMPLETED | OUTPATIENT
Start: 2025-01-15 | End: 2025-01-15

## 2025-01-15 RX ORDER — ASPIRIN 81 MG
1 TABLET, DELAYED RELEASE (ENTERIC COATED) ORAL
Qty: 30 | Refills: 11
Start: 2025-01-15 | End: 2026-01-09

## 2025-01-15 RX ORDER — SIMVASTATIN 5 MG/1
1 TABLET, FILM COATED ORAL
Qty: 30 | Refills: 11
Start: 2025-01-15 | End: 2026-01-09

## 2025-01-15 RX ORDER — SODIUM CHLORIDE 9 MG/ML
1000 INJECTION, SOLUTION INTRAMUSCULAR; INTRAVENOUS; SUBCUTANEOUS
Refills: 0 | Status: DISCONTINUED | OUTPATIENT
Start: 2025-01-15 | End: 2025-01-29

## 2025-01-15 RX ORDER — CLOPIDOGREL BISULFATE 75 MG/1
1 TABLET, FILM COATED ORAL
Qty: 30 | Refills: 11
Start: 2025-01-15 | End: 2026-01-09

## 2025-01-15 RX ORDER — SODIUM CHLORIDE 9 MG/ML
250 INJECTION, SOLUTION INTRAMUSCULAR; INTRAVENOUS; SUBCUTANEOUS ONCE
Refills: 0 | Status: COMPLETED | OUTPATIENT
Start: 2025-01-15 | End: 2025-01-15

## 2025-01-15 RX ADMIN — SODIUM CHLORIDE 75 MILLILITER(S): 9 INJECTION, SOLUTION INTRAMUSCULAR; INTRAVENOUS; SUBCUTANEOUS at 10:34

## 2025-01-15 RX ADMIN — SODIUM CHLORIDE 220 MILLILITER(S): 9 INJECTION, SOLUTION INTRAMUSCULAR; INTRAVENOUS; SUBCUTANEOUS at 12:14

## 2025-01-15 RX ADMIN — SODIUM CHLORIDE 500 MILLILITER(S): 9 INJECTION, SOLUTION INTRAMUSCULAR; INTRAVENOUS; SUBCUTANEOUS at 10:34

## 2025-01-15 RX ADMIN — Medication 81 MILLIGRAM(S): at 10:34

## 2025-01-15 RX ADMIN — Medication 800 MILLIGRAM(S): at 15:42

## 2025-01-15 RX ADMIN — CLOPIDOGREL BISULFATE 75 MILLIGRAM(S): 75 TABLET, FILM COATED ORAL at 10:34

## 2025-01-15 NOTE — PROGRESS NOTE ADULT - SUBJECTIVE AND OBJECTIVE BOX
Interventional Cardiology PA Post PCI SDA Discharge Note      Patient without complaints. Ambulated and voided without difficulties    Afebrile, VSS    Ext: Right Groin: No hematoma, No bruit, dressing; C/D/I        Pulses:    intact RAD/DP/PT to baseline     A/P:  69M, Taishanese-speaking, current smoker and daily ETOH (150mL), with PMHx of HTN, HLD, CKD (Cr 1.39-1.59 recent labs), PVD s/p recent peripheral angiogram @[facility] 11/13/24 with successful YOSVANY to left external iliac artery, also noted to have 60-70% distal right common iliac artery lesion for which patient was recommended to return for staged intervention. Patient reports since his prior procedure he has noticed an improvement in his LLE claudication. However, he continues to experience RLE calf/foot cramping upon walking 1/2 block. Patient denies any numbness, tingling, loss of sensation, nonhealing ulcers or wounds. On ROS, pt endorses dry cough x2-3 weeks, no sputum, denies fever/chills. Pt denies chest pain, SOB, FALL,  orthopnea/PND, leg swelling, dizziness, LOC, fall, syncope, N/V/D, hematuria, BRBPR. In light of patient's risk factors, known residual disease, patient now presents for recommended intervention of right common iliac artery lesion.    S/p Peripheral angio 1/15/25: YOSVANY x 1 RCIA 70%.     ACCESS:  RFA PC  Post cath IVF: 220 cc 4 hrs    INTERVENTIONALIST: Dr. Ortiz (F: Jesus Manuel)  F/U CARDS: Dr. Barfield  DAPT: ASA/Plavix  STATIN: Simvastatin 20mg    1.	       Follow-up with PMD/Cardiologist Carolyne in 72 hours.  2.            Post procedure labs reviewed and stable.    3.            Pt given instructions on importance of taking antiplatelet medication.    4. 	        Stable for discharge as per attending Dr. Ortiz after bed rest, pt voids, groin/wrist stable and 30 minutes of ambulation.  5.             Prescriptions for Aspirin/Plavix e-prescribed and submitted to patient's pharmacy.    6.             Patient will continue statin Simvastatin 20mg  7.	        Discharge forms signed and copies in chart

## 2025-08-26 VITALS
SYSTOLIC BLOOD PRESSURE: 132 MMHG | RESPIRATION RATE: 18 BRPM | DIASTOLIC BLOOD PRESSURE: 63 MMHG | OXYGEN SATURATION: 98 % | HEART RATE: 63 BPM | TEMPERATURE: 98 F

## 2025-08-28 ENCOUNTER — INPATIENT (INPATIENT)
Facility: HOSPITAL | Age: 70
LOS: 0 days | Discharge: HOME | End: 2025-08-29
Attending: STUDENT IN AN ORGANIZED HEALTH CARE EDUCATION/TRAINING PROGRAM | Admitting: STUDENT IN AN ORGANIZED HEALTH CARE EDUCATION/TRAINING PROGRAM
Payer: MEDICARE

## 2025-08-28 DIAGNOSIS — Z95.828 PRESENCE OF OTHER VASCULAR IMPLANTS AND GRAFTS: Chronic | ICD-10-CM

## 2025-08-28 LAB
A1C WITH ESTIMATED AVERAGE GLUCOSE RESULT: 5.5 % — SIGNIFICANT CHANGE UP (ref 4–5.6)
ANION GAP SERPL CALC-SCNC: 13 MMOL/L — SIGNIFICANT CHANGE UP (ref 5–17)
APTT BLD: 34.4 SEC — SIGNIFICANT CHANGE UP (ref 26.1–36.8)
BASOPHILS # BLD AUTO: 0.04 K/UL — SIGNIFICANT CHANGE UP (ref 0–0.2)
BASOPHILS NFR BLD AUTO: 0.6 % — SIGNIFICANT CHANGE UP (ref 0–2)
BUN SERPL-MCNC: 25 MG/DL — HIGH (ref 7–23)
CALCIUM SERPL-MCNC: 10.3 MG/DL — SIGNIFICANT CHANGE UP (ref 8.4–10.5)
CHLORIDE SERPL-SCNC: 101 MMOL/L — SIGNIFICANT CHANGE UP (ref 96–108)
CHOLEST SERPL-MCNC: 201 MG/DL — HIGH
CO2 SERPL-SCNC: 22 MMOL/L — SIGNIFICANT CHANGE UP (ref 22–31)
CREAT SERPL-MCNC: 1.53 MG/DL — HIGH (ref 0.5–1.3)
EGFR: 48 ML/MIN/1.73M2 — LOW
EGFR: 48 ML/MIN/1.73M2 — LOW
EOSINOPHIL # BLD AUTO: 0.24 K/UL — SIGNIFICANT CHANGE UP (ref 0–0.5)
EOSINOPHIL NFR BLD AUTO: 3.5 % — SIGNIFICANT CHANGE UP (ref 0–6)
ESTIMATED AVERAGE GLUCOSE: 111 MG/DL — SIGNIFICANT CHANGE UP (ref 68–114)
GLUCOSE SERPL-MCNC: 96 MG/DL — SIGNIFICANT CHANGE UP (ref 70–99)
HCT VFR BLD CALC: 36.3 % — LOW (ref 39–50)
HDLC SERPL-MCNC: 90 MG/DL — SIGNIFICANT CHANGE UP
HGB BLD-MCNC: 11.9 G/DL — LOW (ref 13–17)
IMM GRANULOCYTES # BLD AUTO: 0.02 K/UL — SIGNIFICANT CHANGE UP (ref 0–0.07)
IMM GRANULOCYTES NFR BLD AUTO: 0.3 % — SIGNIFICANT CHANGE UP (ref 0–0.9)
INR BLD: 0.87 — SIGNIFICANT CHANGE UP (ref 0.85–1.16)
LDLC SERPL-MCNC: 80 MG/DL — SIGNIFICANT CHANGE UP
LIPID PNL WITH DIRECT LDL SERPL: 80 MG/DL — SIGNIFICANT CHANGE UP
LYMPHOCYTES # BLD AUTO: 1.53 K/UL — SIGNIFICANT CHANGE UP (ref 1–3.3)
LYMPHOCYTES NFR BLD AUTO: 22.2 % — SIGNIFICANT CHANGE UP (ref 13–44)
MAGNESIUM SERPL-MCNC: 1.8 MG/DL — SIGNIFICANT CHANGE UP (ref 1.6–2.6)
MCHC RBC-ENTMCNC: 31.1 PG — SIGNIFICANT CHANGE UP (ref 27–34)
MCHC RBC-ENTMCNC: 32.8 G/DL — SIGNIFICANT CHANGE UP (ref 32–36)
MCV RBC AUTO: 94.8 FL — SIGNIFICANT CHANGE UP (ref 80–100)
MONOCYTES # BLD AUTO: 0.92 K/UL — HIGH (ref 0–0.9)
MONOCYTES NFR BLD AUTO: 13.4 % — SIGNIFICANT CHANGE UP (ref 2–14)
NEUTROPHILS # BLD AUTO: 4.14 K/UL — SIGNIFICANT CHANGE UP (ref 1.8–7.4)
NEUTROPHILS NFR BLD AUTO: 60 % — SIGNIFICANT CHANGE UP (ref 43–77)
NONHDLC SERPL-MCNC: 111 MG/DL — SIGNIFICANT CHANGE UP
NRBC # BLD AUTO: 0 K/UL — SIGNIFICANT CHANGE UP (ref 0–0)
NRBC # FLD: 0 K/UL — SIGNIFICANT CHANGE UP (ref 0–0)
NRBC BLD AUTO-RTO: 0 /100 WBCS — SIGNIFICANT CHANGE UP (ref 0–0)
PLATELET # BLD AUTO: 269 K/UL — SIGNIFICANT CHANGE UP (ref 150–400)
PMV BLD: 9.4 FL — SIGNIFICANT CHANGE UP (ref 7–13)
POTASSIUM SERPL-MCNC: 4.8 MMOL/L — SIGNIFICANT CHANGE UP (ref 3.5–5.3)
POTASSIUM SERPL-SCNC: 4.8 MMOL/L — SIGNIFICANT CHANGE UP (ref 3.5–5.3)
PROTHROM AB SERPL-ACNC: 10.1 SEC — SIGNIFICANT CHANGE UP (ref 9.9–13.4)
RBC # BLD: 3.83 M/UL — LOW (ref 4.2–5.8)
RBC # FLD: 13.1 % — SIGNIFICANT CHANGE UP (ref 10.3–14.5)
SODIUM SERPL-SCNC: 136 MMOL/L — SIGNIFICANT CHANGE UP (ref 135–145)
TRIGL SERPL-MCNC: 192 MG/DL — HIGH
WBC # BLD: 6.89 K/UL — SIGNIFICANT CHANGE UP (ref 3.8–10.5)
WBC # FLD AUTO: 6.89 K/UL — SIGNIFICANT CHANGE UP (ref 3.8–10.5)

## 2025-08-28 PROCEDURE — 85025 COMPLETE CBC W/AUTO DIFF WBC: CPT

## 2025-08-28 PROCEDURE — 80048 BASIC METABOLIC PNL TOTAL CA: CPT

## 2025-08-28 PROCEDURE — 85610 PROTHROMBIN TIME: CPT

## 2025-08-28 PROCEDURE — 83036 HEMOGLOBIN GLYCOSYLATED A1C: CPT

## 2025-08-28 PROCEDURE — 80061 LIPID PANEL: CPT

## 2025-08-28 PROCEDURE — 83735 ASSAY OF MAGNESIUM: CPT

## 2025-08-28 PROCEDURE — 85730 THROMBOPLASTIN TIME PARTIAL: CPT

## 2025-08-28 PROCEDURE — 93010 ELECTROCARDIOGRAM REPORT: CPT

## 2025-08-28 RX ORDER — ATORVASTATIN CALCIUM 80 MG/1
40 TABLET, FILM COATED ORAL AT BEDTIME
Refills: 0 | Status: DISCONTINUED | OUTPATIENT
Start: 2025-08-28 | End: 2025-08-29

## 2025-08-28 RX ORDER — CLOPIDOGREL BISULFATE 75 MG/1
600 TABLET, FILM COATED ORAL ONCE
Refills: 0 | Status: COMPLETED | OUTPATIENT
Start: 2025-08-28 | End: 2025-08-28

## 2025-08-28 RX ORDER — ASPIRIN 325 MG
81 TABLET ORAL DAILY
Refills: 0 | Status: DISCONTINUED | OUTPATIENT
Start: 2025-08-29 | End: 2025-08-29

## 2025-08-28 RX ORDER — LOSARTAN POTASSIUM 100 MG/1
100 TABLET, FILM COATED ORAL DAILY
Refills: 0 | Status: DISCONTINUED | OUTPATIENT
Start: 2025-08-29 | End: 2025-08-29

## 2025-08-28 RX ORDER — CLOPIDOGREL BISULFATE 75 MG/1
75 TABLET, FILM COATED ORAL DAILY
Refills: 0 | Status: DISCONTINUED | OUTPATIENT
Start: 2025-08-29 | End: 2025-08-29

## 2025-08-28 RX ORDER — AMLODIPINE BESYLATE 10 MG/1
5 TABLET ORAL DAILY
Refills: 0 | Status: DISCONTINUED | OUTPATIENT
Start: 2025-08-29 | End: 2025-08-29

## 2025-08-28 RX ADMIN — Medication 500 MILLILITER(S): at 16:18

## 2025-08-28 RX ADMIN — ATORVASTATIN CALCIUM 40 MILLIGRAM(S): 80 TABLET, FILM COATED ORAL at 23:03

## 2025-08-28 RX ADMIN — Medication 50 MILLILITER(S): at 16:18

## 2025-08-28 RX ADMIN — Medication 210 MILLILITER(S): at 20:28

## 2025-08-28 RX ADMIN — CLOPIDOGREL BISULFATE 600 MILLIGRAM(S): 75 TABLET, FILM COATED ORAL at 16:17

## 2025-08-29 VITALS
TEMPERATURE: 98 F | OXYGEN SATURATION: 97 % | HEART RATE: 73 BPM | DIASTOLIC BLOOD PRESSURE: 63 MMHG | RESPIRATION RATE: 18 BRPM | SYSTOLIC BLOOD PRESSURE: 143 MMHG

## 2025-08-29 LAB
A1C WITH ESTIMATED AVERAGE GLUCOSE RESULT: 5.3 % — SIGNIFICANT CHANGE UP (ref 4–5.6)
ALBUMIN SERPL ELPH-MCNC: 3.9 G/DL — SIGNIFICANT CHANGE UP (ref 3.3–5)
ALP SERPL-CCNC: 61 U/L — SIGNIFICANT CHANGE UP (ref 40–120)
ALT FLD-CCNC: 17 U/L — SIGNIFICANT CHANGE UP (ref 10–45)
ANION GAP SERPL CALC-SCNC: 13 MMOL/L — SIGNIFICANT CHANGE UP (ref 5–17)
AST SERPL-CCNC: 23 U/L — SIGNIFICANT CHANGE UP (ref 10–40)
BILIRUB SERPL-MCNC: 0.2 MG/DL — SIGNIFICANT CHANGE UP (ref 0.2–1.2)
BUN SERPL-MCNC: 23 MG/DL — SIGNIFICANT CHANGE UP (ref 7–23)
CALCIUM SERPL-MCNC: 9.5 MG/DL — SIGNIFICANT CHANGE UP (ref 8.4–10.5)
CHLORIDE SERPL-SCNC: 106 MMOL/L — SIGNIFICANT CHANGE UP (ref 96–108)
CHOLEST SERPL-MCNC: 179 MG/DL — SIGNIFICANT CHANGE UP
CO2 SERPL-SCNC: 21 MMOL/L — LOW (ref 22–31)
CREAT SERPL-MCNC: 1.66 MG/DL — HIGH (ref 0.5–1.3)
EGFR: 44 ML/MIN/1.73M2 — LOW
EGFR: 44 ML/MIN/1.73M2 — LOW
ESTIMATED AVERAGE GLUCOSE: 105 MG/DL — SIGNIFICANT CHANGE UP (ref 68–114)
GLUCOSE SERPL-MCNC: 95 MG/DL — SIGNIFICANT CHANGE UP (ref 70–99)
HCT VFR BLD CALC: 33.3 % — LOW (ref 39–50)
HDLC SERPL-MCNC: 75 MG/DL — SIGNIFICANT CHANGE UP
HGB BLD-MCNC: 10.6 G/DL — LOW (ref 13–17)
LDLC SERPL-MCNC: 69 MG/DL — SIGNIFICANT CHANGE UP
LIPID PNL WITH DIRECT LDL SERPL: 69 MG/DL — SIGNIFICANT CHANGE UP
MAGNESIUM SERPL-MCNC: 1.8 MG/DL — SIGNIFICANT CHANGE UP (ref 1.6–2.6)
MCHC RBC-ENTMCNC: 30.6 PG — SIGNIFICANT CHANGE UP (ref 27–34)
MCHC RBC-ENTMCNC: 31.8 G/DL — LOW (ref 32–36)
MCV RBC AUTO: 96.2 FL — SIGNIFICANT CHANGE UP (ref 80–100)
NONHDLC SERPL-MCNC: 104 MG/DL — SIGNIFICANT CHANGE UP
NRBC # BLD AUTO: 0 K/UL — SIGNIFICANT CHANGE UP (ref 0–0)
NRBC # FLD: 0 K/UL — SIGNIFICANT CHANGE UP (ref 0–0)
NRBC BLD AUTO-RTO: 0 /100 WBCS — SIGNIFICANT CHANGE UP (ref 0–0)
PLATELET # BLD AUTO: 243 K/UL — SIGNIFICANT CHANGE UP (ref 150–400)
PMV BLD: 9.4 FL — SIGNIFICANT CHANGE UP (ref 7–13)
POTASSIUM SERPL-MCNC: 4.6 MMOL/L — SIGNIFICANT CHANGE UP (ref 3.5–5.3)
POTASSIUM SERPL-SCNC: 4.6 MMOL/L — SIGNIFICANT CHANGE UP (ref 3.5–5.3)
PROT SERPL-MCNC: 6.5 G/DL — SIGNIFICANT CHANGE UP (ref 6–8.3)
RBC # BLD: 3.46 M/UL — LOW (ref 4.2–5.8)
RBC # FLD: 13.1 % — SIGNIFICANT CHANGE UP (ref 10.3–14.5)
SODIUM SERPL-SCNC: 140 MMOL/L — SIGNIFICANT CHANGE UP (ref 135–145)
TRIGL SERPL-MCNC: 215 MG/DL — HIGH
WBC # BLD: 6.48 K/UL — SIGNIFICANT CHANGE UP (ref 3.8–10.5)
WBC # FLD AUTO: 6.48 K/UL — SIGNIFICANT CHANGE UP (ref 3.8–10.5)

## 2025-08-29 PROCEDURE — 99239 HOSP IP/OBS DSCHRG MGMT >30: CPT

## 2025-08-29 PROCEDURE — 80048 BASIC METABOLIC PNL TOTAL CA: CPT

## 2025-08-29 PROCEDURE — 85025 COMPLETE CBC W/AUTO DIFF WBC: CPT

## 2025-08-29 PROCEDURE — 83735 ASSAY OF MAGNESIUM: CPT

## 2025-08-29 PROCEDURE — 80061 LIPID PANEL: CPT

## 2025-08-29 PROCEDURE — 80053 COMPREHEN METABOLIC PANEL: CPT

## 2025-08-29 PROCEDURE — 85027 COMPLETE CBC AUTOMATED: CPT

## 2025-08-29 PROCEDURE — 85347 COAGULATION TIME ACTIVATED: CPT

## 2025-08-29 PROCEDURE — 36415 COLL VENOUS BLD VENIPUNCTURE: CPT

## 2025-08-29 PROCEDURE — 93010 ELECTROCARDIOGRAM REPORT: CPT

## 2025-08-29 PROCEDURE — 85730 THROMBOPLASTIN TIME PARTIAL: CPT

## 2025-08-29 PROCEDURE — 93005 ELECTROCARDIOGRAM TRACING: CPT

## 2025-08-29 PROCEDURE — 99223 1ST HOSP IP/OBS HIGH 75: CPT

## 2025-08-29 PROCEDURE — 85610 PROTHROMBIN TIME: CPT

## 2025-08-29 PROCEDURE — 83036 HEMOGLOBIN GLYCOSYLATED A1C: CPT

## 2025-08-29 RX ORDER — MAGNESIUM OXIDE 400 MG
800 TABLET ORAL ONCE
Refills: 0 | Status: COMPLETED | OUTPATIENT
Start: 2025-08-29 | End: 2025-08-29

## 2025-08-29 RX ORDER — CLOPIDOGREL BISULFATE 75 MG/1
1 TABLET, FILM COATED ORAL
Qty: 30 | Refills: 11
Start: 2025-08-29 | End: 2026-08-23

## 2025-08-29 RX ORDER — ASPIRIN 325 MG
1 TABLET ORAL
Qty: 30 | Refills: 11
Start: 2025-08-29 | End: 2026-08-23

## 2025-08-29 RX ORDER — AMLODIPINE BESYLATE 10 MG/1
5 TABLET ORAL DAILY
Refills: 0 | Status: DISCONTINUED | OUTPATIENT
Start: 2025-08-29 | End: 2025-08-29

## 2025-08-29 RX ORDER — LOSARTAN POTASSIUM 100 MG/1
100 TABLET, FILM COATED ORAL DAILY
Refills: 0 | Status: DISCONTINUED | OUTPATIENT
Start: 2025-08-29 | End: 2025-08-29

## 2025-08-29 RX ADMIN — AMLODIPINE BESYLATE 5 MILLIGRAM(S): 10 TABLET ORAL at 06:22

## 2025-08-29 RX ADMIN — Medication 800 MILLIGRAM(S): at 09:50

## 2025-08-29 RX ADMIN — CLOPIDOGREL BISULFATE 75 MILLIGRAM(S): 75 TABLET, FILM COATED ORAL at 09:50

## 2025-08-29 RX ADMIN — Medication 81 MILLIGRAM(S): at 09:50

## 2025-08-29 RX ADMIN — LOSARTAN POTASSIUM 100 MILLIGRAM(S): 100 TABLET, FILM COATED ORAL at 06:22

## 2025-09-05 DIAGNOSIS — F17.200 NICOTINE DEPENDENCE, UNSPECIFIED, UNCOMPLICATED: ICD-10-CM

## 2025-09-05 DIAGNOSIS — N18.30 CHRONIC KIDNEY DISEASE, STAGE 3 UNSPECIFIED: ICD-10-CM

## 2025-09-05 DIAGNOSIS — E78.5 HYPERLIPIDEMIA, UNSPECIFIED: ICD-10-CM

## 2025-09-05 DIAGNOSIS — Z95.820 PERIPHERAL VASCULAR ANGIOPLASTY STATUS WITH IMPLANTS AND GRAFTS: ICD-10-CM

## 2025-09-05 DIAGNOSIS — I70.92 CHRONIC TOTAL OCCLUSION OF ARTERY OF THE EXTREMITIES: ICD-10-CM
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
## 2025-09-05 DIAGNOSIS — D63.1 ANEMIA IN CHRONIC KIDNEY DISEASE: ICD-10-CM

## 2025-09-05 DIAGNOSIS — I70.0 ATHEROSCLEROSIS OF AORTA: ICD-10-CM

## 2025-09-05 DIAGNOSIS — I12.9 HYPERTENSIVE CHRONIC KIDNEY DISEASE WITH STAGE 1 THROUGH STAGE 4 CHRONIC KIDNEY DISEASE, OR UNSPECIFIED CHRONIC KIDNEY DISEASE: ICD-10-CM

## 2025-09-05 DIAGNOSIS — M19.90 UNSPECIFIED OSTEOARTHRITIS, UNSPECIFIED SITE: ICD-10-CM
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
## 2025-09-05 DIAGNOSIS — Z79.02 LONG TERM (CURRENT) USE OF ANTITHROMBOTICS/ANTIPLATELETS: ICD-10-CM

## 2025-09-05 DIAGNOSIS — L76.32 POSTPROCEDURAL HEMATOMA OF SKIN AND SUBCUTANEOUS TISSUE FOLLOWING OTHER PROCEDURE: ICD-10-CM

## 2025-09-05 DIAGNOSIS — Z79.82 LONG TERM (CURRENT) USE OF ASPIRIN: ICD-10-CM
